# Patient Record
Sex: MALE | Race: WHITE | NOT HISPANIC OR LATINO | URBAN - METROPOLITAN AREA
[De-identification: names, ages, dates, MRNs, and addresses within clinical notes are randomized per-mention and may not be internally consistent; named-entity substitution may affect disease eponyms.]

---

## 2022-07-14 ENCOUNTER — DOCUMENTATION ONLY (OUTPATIENT)
Dept: HEMATOLOGY/ONCOLOGY | Facility: CLINIC | Age: 47
End: 2022-07-14

## 2022-07-14 NOTE — PROGRESS NOTES
CT CAP & CT Neck scheduled 07/21/22 @ [facility] w/ 9:45 arrival   Appt location, date & time confirmed w/ patient wife Janette

## 2022-07-25 PROBLEM — C81.18: Status: ACTIVE | Noted: 2022-07-25

## 2022-07-25 NOTE — PROGRESS NOTES
Subjective:       Patient ID: Med Larsen Jr. is a 47 y.o. male.    Chief Complaint: Results      History of Present Illness  Chief complaint: Hodgkin Lymphoma, at least stage III    Hem Hx:  12/3/19-5/5/20: ABVD x 2, then AVD    HPI: 48 y/o M w/ PMHx of HTN presented with left neck lump. He went to his PCP who referred him to surgery. He had excisional left neck LN biopsy that revealed classical nodular sclerosis Hodgkin lymphoma. Presents to [facility] for follow up.    PET CT 11/2019 consistent with at least stage III disease. Bone marrow biopsy negative 11/2019.    He started ABVD on 12/3/19, completed AVD 5/5/20    Interim PET CT after C3D1 shows complete resolution of adenopathy    Post treatment PET CT showed interval resolution activity related to marrow hyperstimulation and no evidence of disease recurrence.    Today 7/26/22: Patient here today for follow up visit with his wife. He is doing well overall and is without any significant complaints today. He had large lipoma to his right upper back that has been present for several years. He states it occasional aches/burns but it does not bother him too much. No adenopathy. His bowels are moving well and he denies any abdominal pain. Appetite is good and weight is stable. Denies fevers, chills or recent infections/illnesses. He denies SOB, CP, N/V/D/C, melena, hematochezia, testicular mass or swelling. Energy levels are stable. He is smoking again. He still has not done colonoscopy but states he was contacted by office regarding scheduling. He states he will call to schedule colonoscopy. No concerns reported.    PMHx: HTN, Hodgkin lymphoma, DM  PSHx: back surgery x2  Social Hx: current every day smoker 1 ppd, no tobacco, no ETOH  Meds: reviewed  Family Hx: mother: kidney cancer  Allergies: NKDA    Labs:  7/18/22 K 3.7, Cr 0.85, Alb 4.2, TP 7.5, Ca 10.5, , WBC 9.13, Hgb 16.3, , ANC 5.59, ALC 2.58, ESR 1  4/24/22 K 3 Glu 267 Cr 1.08 Alb 3.6 TP 6.9  Ca 10.1  WBC 9.48 Hg 15  ANC 5.75 ESR 4  22 Cr 1.06, Alb 3.7, WBC 7.39, Hgb 15.8, , ANC 4.12, ESR 2  10/24/21 Cr 1.19 Alb 3.9 TP 7.5  WBC 7.6 Hg 15.5  ESR 5  21 Cr 1.06, Alb 3.9, TP 7.2, , WBC 8.06, Hgb 14.8, , ANC 3.29, ESR 0  21 Cr 1.08 Alb 3.9 TP 7.4 [id number] WBC 7.57 Hg 15.6  ANC 3.86 ESR 5  1/15/21 Glu 201, K 4.2, Cr 0.99, Alb 4.1, TP 7.7, LDh 170, WBC 9.67, Hgb 16.6, , ANC 5.34, ESR 0  10/21/20 Glu 148 K 3.8 Cr 0.99 Alb 3.7 TP 7.4 AST 23 ALT 16  ESR 6 WBC 8.15 Hg 15.6  ANC 4.43  7/10/20 K 3.13, , hgb 13.3, , ANC 4.69  20 WBC 16.15 Hg 11.  ANC 12.09  20 K 2.95 Cr 0.96  AST 17 ALT 17 WBC 17.77 Hg 12.9  ANC 13.58  3/9/20 Cr 0.87, Glu 114, K 2.87, AlkPhos 127, , WBC 25.35, Hgb 13.6, ANC 18.43,   2/10/20 Cr 1.15 Glu 169 AlkPhos 131  WBC 21.67 Hg 13.8  ANC 16.41  20 Cr 1.2 AlkPhos 174 AST 28 ALT 36  WBC 24.14 Hg 13.1  ANC 17.39  19 Hgb 11.9, , ANC 8.93, WBC 15.50, ALT 55, AST 44, Alkphos 155  19 HIV neg Alb 2.8 TP 9.1 AlkPhos 195 Hep B S ag neg Hep C Ab neg Hep B C ab neg Hep B S ab neg  b HCG <1.2 WBC 8.51 Hg 12.3  ESR 95 AFP 2    Imagin22 CT soft tissue neck- no evidence of lymphadenopathy in the neck.   22 CT C/A/P: no evidence of lymphoproliferative disease in the thorax, abdomen or pelvis. Small nonobstructive left renal stones. Advanced L5-S1 disc disease.     21 CT N/C/A/P: overall stable appearance to the neck soft tissues, chest, abdomen and pelvis. No evidence of recurrent or progressive disease. Possible wall thickening the proximal transverse colon, correlation with endoscopy if not recently performed. 2 stable incidental pulmonary nodules. No further follow up required according to fleischner criteria.    21 CT N/C/A/P w/ and w/o contrast: no evidence of  lymphoproliferative disease in the neck, thorax, abdomen or pelvis.  7/9/20 PET CT: interval resolution activity related to marrow hyperstimulation. No evidence of disease recurrence.  2/5/20 PET CT: hypermetabolism in marrow likely due to stimulation. Prior hypermetabolic adenopathy has completely resolved  11/18/19 MUGA: LVEF 57%  11/7/19 US scrotum/testicles: no testicular mass. Cysts in b/l epididymal heads  11/7/19 PFTs: FEV1 84%  11/4/19 PET CT: SUV in right axilla 14.7, 3.4x2.1cm, SUV in left axilla 15.2, 4x.18cm, right hilum SUV 8.7 2.9x1.9cm, left obturator SUV 13 1.8cm. SUV in testicles 4.5    Path:  11/6/19 bone marrow biopsy: no evidence of Hodgkin Lymphoma  10/15/19 left neck excisional lymph node biopsy: classic Hodgkin Lymphoma, nodular sclerosis subtype. Fragmented LN with thickened capsule and nodules surrounded by fibrous bands. Nodules contain Tim Tasia cells and lacunar cells. Tim Tasia cells are positive for CD15 and CD30, weak PAX5. No cells positive for ALK1 and pancytokeratin.         Review of Systems  CONSTITUTIONAL: no fevers, no chills, no night sweats, no weight loss, no fatigue, no weakness  HEMATOLOGIC: no abnormal bleeding, no abnormal bruising  ONCOLOGIC: No masses or lumps  HEENT: no vision loss, no tinnitus or hearing loss, no nose bleeding, no dysphagia, no odynophagia  CVS: no chest pain, no palpitations, no dyspnea on exertion  RESP: no shortness of breath, no hemoptysis, no cough  GI: no nausea, no vomiting, no diarrhea, no constipation, no melena, no hematochezia, no hematemesis, no abdominal pain, no increase in abdominal girth  : no dysuria, no hematuria, no hesitancy, no scrotal swelling, no discharge  INTEGUMENT: no rashes, no abnormal bruising, no nail pitting, no hyperpigmentation  NEURO: no falls, no memory loss, no paresthesias or dysesthesias, no urofecal incontinence or retention, no loss of strength on any extremity  MSK: no back pain, no new joint pain,  no joint swelling  PSYCH: no suicidal or homicidal ideation, no depression, no insomnia, no anhedonia  ENDOCRINE: no heat or cold intolerance, no polyuria, no polydipsia         Objective:      Physical Exam  Vitals:    07/26/22 1428   BP: 121/79   Pulse: 83   Resp: 16   Temp: 98.9 °F (37.2 °C)        ECOG PS 0  GA: AAOx3, NAD  HEENT: NCAT, PERRLA, EOMI, fair dentition, no oral ulcers  LYMPH: no cervical, axillary, supraclavicular or inguinal nodes appreciated  CVS: s1s2 RRR, no M/R/G  RESP: CTA b/l, no crackles, mild exp wheez, no rhonchi  ABD: soft, NT, ND, BS+, no hepatosplenomegaly  : no palpable lesions on either testicle  EXT: no deformities, no pedal edema  SKIN: no bruises or purpura, warm and dry, large lipoma right upper back  NEURO: normal mentation, strength 5/5 on all 4 extremities, no sensory deficits     Assessment:       Problem List Items Addressed This Visit        Oncology    Hodgkin's disease, nodular sclerosis, of lymph nodes of multiple sites - Primary            Plan:       1. Hodgkin's disease, nodular sclerosis of lymph nodes of multiple sites C81.18   At least stage III disease, PET CT with findings suggestive of stage IV disease if testicular involvement but US negative, bone marrow biopsy negative, LVEF at 57%, PFTs WNL  Plan: ABVD x2, restaging PET CT, if DS 1-3 then AVD x4, if DS 4 then additional 2 cycles of ABVD, followed by PET CT, if DS 5 then escalated BEACOPP/referral to MD Oakes  Interim PET CT after C3D1 with complete resolution of adenopathy DS 1. Hypermetabolism in marrow was likely due to neulasta and marrow recovery  Bleomycin stopped for following 2 cycles of chemo. AVD completed 5/5/20.  PET CT 7/9/20 showed interval resolution activity related to marrow hyperstimulation. No evidence of disease recurrence.  Port has been removed  Imaging at 6, 12 and 24 months with CT N/C/A/P w/ IV contrast  6 month scans on 1/14/21 and 12 month scans on 7/26/21 without any evidence  of disease, 24 month scans on 7/21/22 without evidence of disease  H&P every 3 months for 2 years, then every 6 months for 3rd year and then annually  He is smoking again and he also has some end expiratory wheezing on exam. We discussed cessation. We again discussed long term toxicity with Bleomycin (lung disease), long term toxicity with adriamycin (heart disease) and general long term possible complications including MDS and AML. He has follow up with pulm next week.  Colonoscopy was due 1/2022, not done, referral sent previously, patient states he will call to schedule  RTC in 6 months with CBC, CMP, LDH, ESR   Call if any questions or concerns      DORA Mata

## 2022-07-26 ENCOUNTER — OFFICE VISIT (OUTPATIENT)
Dept: HEMATOLOGY/ONCOLOGY | Facility: CLINIC | Age: 47
End: 2022-07-26
Payer: MEDICARE

## 2022-07-26 VITALS
TEMPERATURE: 99 F | RESPIRATION RATE: 16 BRPM | OXYGEN SATURATION: 97 % | BODY MASS INDEX: 30.64 KG/M2 | SYSTOLIC BLOOD PRESSURE: 121 MMHG | WEIGHT: 201.5 LBS | DIASTOLIC BLOOD PRESSURE: 79 MMHG | HEART RATE: 83 BPM

## 2022-07-26 DIAGNOSIS — C81.18 HODGKIN'S DISEASE, NODULAR SCLEROSIS, OF LYMPH NODES OF MULTIPLE SITES: Primary | ICD-10-CM

## 2022-07-26 PROCEDURE — 99213 OFFICE O/P EST LOW 20 MIN: CPT | Mod: ,,, | Performed by: NURSE PRACTITIONER

## 2022-07-26 PROCEDURE — 99213 PR OFFICE/OUTPT VISIT, EST, LEVL III, 20-29 MIN: ICD-10-PCS | Mod: ,,, | Performed by: NURSE PRACTITIONER

## 2023-01-25 ENCOUNTER — TELEPHONE (OUTPATIENT)
Dept: HEMATOLOGY/ONCOLOGY | Facility: CLINIC | Age: 48
End: 2023-01-25
Payer: MEDICARE

## 2023-01-26 DIAGNOSIS — E87.6 HYPOKALEMIA: Primary | ICD-10-CM

## 2023-01-26 RX ORDER — POTASSIUM CHLORIDE 20 MEQ/1
40 TABLET, EXTENDED RELEASE ORAL 2 TIMES DAILY
Qty: 8 TABLET | Refills: 0 | Status: SHIPPED | OUTPATIENT
Start: 2023-01-26 | End: 2023-01-28

## 2023-01-27 ENCOUNTER — OFFICE VISIT (OUTPATIENT)
Dept: HEMATOLOGY/ONCOLOGY | Facility: CLINIC | Age: 48
End: 2023-01-27
Payer: MEDICARE

## 2023-01-27 VITALS
HEIGHT: 68 IN | WEIGHT: 188 LBS | DIASTOLIC BLOOD PRESSURE: 69 MMHG | TEMPERATURE: 99 F | OXYGEN SATURATION: 97 % | BODY MASS INDEX: 28.49 KG/M2 | SYSTOLIC BLOOD PRESSURE: 112 MMHG | HEART RATE: 77 BPM | RESPIRATION RATE: 18 BRPM

## 2023-01-27 DIAGNOSIS — C81.18 HODGKIN'S DISEASE, NODULAR SCLEROSIS, OF LYMPH NODES OF MULTIPLE SITES: Primary | ICD-10-CM

## 2023-01-27 DIAGNOSIS — E87.6 HYPOKALEMIA: ICD-10-CM

## 2023-01-27 DIAGNOSIS — R74.01 TRANSAMINITIS: ICD-10-CM

## 2023-01-27 DIAGNOSIS — N17.9 AKI (ACUTE KIDNEY INJURY): ICD-10-CM

## 2023-01-27 PROCEDURE — 99215 OFFICE O/P EST HI 40 MIN: CPT | Mod: ,,, | Performed by: STUDENT IN AN ORGANIZED HEALTH CARE EDUCATION/TRAINING PROGRAM

## 2023-01-27 PROCEDURE — 99215 PR OFFICE/OUTPT VISIT, EST, LEVL V, 40-54 MIN: ICD-10-PCS | Mod: ,,, | Performed by: STUDENT IN AN ORGANIZED HEALTH CARE EDUCATION/TRAINING PROGRAM

## 2023-01-27 NOTE — PROGRESS NOTES
Subjective:       Patient ID: Med Larsen Jr. is a 47 y.o. male.    Chief Complaint: NO COMPLAINTS      History of Present Illness  Chief complaint: Hodgkin Lymphoma, at least stage III    Hem Hx:  12/3/19-5/5/20: ABVD x 2, then AVD    HPI: 46 y/o M w/ PMHx of HTN presented with left neck lump. He went to his PCP who referred him to surgery. He had excisional left neck LN biopsy that revealed classical nodular sclerosis Hodgkin lymphoma. Presents to [facility] for follow up.    PET CT 11/2019 consistent with at least stage III disease. Bone marrow biopsy negative 11/2019.    He started ABVD on 12/3/19, completed AVD 5/5/20    Interim PET CT after C3D1 shows complete resolution of adenopathy    Post treatment PET CT showed interval resolution activity related to marrow hyperstimulation and no evidence of disease recurrence.    Today , 01/27/2023, patient denies any acute concerns today.  He denies any fevers, chills, drenching night sweats, decreased appetite, weight loss.  He denies any alcohol use, over-the-counter herbs, supplements use.  He does report using 2 tablets of Tylenol every night for knee pain.    PMHx: HTN, Hodgkin lymphoma, DM  PSHx: back surgery x2  Social Hx: current every day smoker 1 ppd, no tobacco, no ETOH  Meds: reviewed  Family Hx: mother: kidney cancer  Allergies: NKDA    Labs:  01/25/2023:  Creatinine 1.51, albumin 3.6, calcium 9.6, alkaline phosphatase 96, total bili 0.9, AST 43, ALT 57, , WBC count 8.9, hemoglobin 14.2, platelet count 193, ANC 5.36, sedimentation rate 10.  7/18/22 K 3.7, Cr 0.85, Alb 4.2, TP 7.5, Ca 10.5, , WBC 9.13, Hgb 16.3, , ANC 5.59, ALC 2.58, ESR 1  4/24/22 K 3 Glu 267 Cr 1.08 Alb 3.6 TP 6.9 Ca 10.1  WBC 9.48 Hg 15  ANC 5.75 ESR 4  1/17/22 Cr 1.06, Alb 3.7, WBC 7.39, Hgb 15.8, , ANC 4.12, ESR 2  10/24/21 Cr 1.19 Alb 3.9 TP 7.5  WBC 7.6 Hg 15.5  ESR 5  7/23/21 Cr 1.06, Alb 3.9, TP 7.2, , WBC 8.06, Hgb  14.8, , ANC 3.29, ESR 0  21 Cr 1.08 Alb 3.9 TP 7.4 [id number] WBC 7.57 Hg 15.6  ANC 3.86 ESR 5  1/15/21 Glu 201, K 4.2, Cr 0.99, Alb 4.1, TP 7.7, LDh 170, WBC 9.67, Hgb 16.6, , ANC 5.34, ESR 0  10/21/20 Glu 148 K 3.8 Cr 0.99 Alb 3.7 TP 7.4 AST 23 ALT 16  ESR 6 WBC 8.15 Hg 15.6  ANC 4.43  7/10/20 K 3.13, , hgb 13.3, , ANC 4.69  20 WBC 16.15 Hg 11.  ANC 12.09  20 K 2.95 Cr 0.96  AST 17 ALT 17 WBC 17.77 Hg 12.9  ANC 13.58  3/9/20 Cr 0.87, Glu 114, K 2.87, AlkPhos 127, , WBC 25.35, Hgb 13.6, ANC 18.43,   2/10/20 Cr 1.15 Glu 169 AlkPhos 131  WBC 21.67 Hg 13.8  ANC 16.41  20 Cr 1.2 AlkPhos 174 AST 28 ALT 36  WBC 24.14 Hg 13.1  ANC 17.39  19 Hgb 11.9, , ANC 8.93, WBC 15.50, ALT 55, AST 44, Alkphos 155  19 HIV neg Alb 2.8 TP 9.1 AlkPhos 195 Hep B S ag neg Hep C Ab neg Hep B C ab neg Hep B S ab neg  b HCG <1.2 WBC 8.51 Hg 12.3  ESR 95 AFP 2    Imagin22 CT soft tissue neck- no evidence of lymphadenopathy in the neck.   22 CT C/A/P: no evidence of lymphoproliferative disease in the thorax, abdomen or pelvis. Small nonobstructive left renal stones. Advanced L5-S1 disc disease.     21 CT N/C/A/P: overall stable appearance to the neck soft tissues, chest, abdomen and pelvis. No evidence of recurrent or progressive disease. Possible wall thickening the proximal transverse colon, correlation with endoscopy if not recently performed. 2 stable incidental pulmonary nodules. No further follow up required according to fleischner criteria.    21 CT N/C/A/P w/ and w/o contrast: no evidence of lymphoproliferative disease in the neck, thorax, abdomen or pelvis.  20 PET CT: interval resolution activity related to marrow hyperstimulation. No evidence of disease recurrence.  20 PET CT: hypermetabolism in marrow likely due to stimulation. Prior  hypermetabolic adenopathy has completely resolved  11/18/19 MUGA: LVEF 57%  11/7/19 US scrotum/testicles: no testicular mass. Cysts in b/l epididymal heads  11/7/19 PFTs: FEV1 84%  11/4/19 PET CT: SUV in right axilla 14.7, 3.4x2.1cm, SUV in left axilla 15.2, 4x.18cm, right hilum SUV 8.7 2.9x1.9cm, left obturator SUV 13 1.8cm. SUV in testicles 4.5    Path:  11/6/19 bone marrow biopsy: no evidence of Hodgkin Lymphoma  10/15/19 left neck excisional lymph node biopsy: classic Hodgkin Lymphoma, nodular sclerosis subtype. Fragmented LN with thickened capsule and nodules surrounded by fibrous bands. Nodules contain Tim Tasia cells and lacunar cells. Tim Tasia cells are positive for CD15 and CD30, weak PAX5. No cells positive for ALK1 and pancytokeratin.         Review of Systems  CONSTITUTIONAL: no fevers, no chills, no night sweats, no weight loss, no fatigue, no weakness  HEMATOLOGIC: no abnormal bleeding, no abnormal bruising  ONCOLOGIC: No masses or lumps  HEENT: no vision loss, no tinnitus or hearing loss, no nose bleeding, no dysphagia, no odynophagia  CVS: no chest pain, no palpitations, no dyspnea on exertion  RESP: no shortness of breath, no hemoptysis, no cough  GI: no nausea, no vomiting, no diarrhea, no constipation, no melena, no hematochezia, no hematemesis, no abdominal pain, no increase in abdominal girth  : no dysuria, no hematuria, no hesitancy, no scrotal swelling, no discharge  INTEGUMENT: no rashes, no abnormal bruising, no nail pitting, no hyperpigmentation  NEURO: no falls, no memory loss, no paresthesias or dysesthesias, no urofecal incontinence or retention, no loss of strength on any extremity  MSK: no back pain, no new joint pain, no joint swelling  PSYCH: no suicidal or homicidal ideation, no depression, no insomnia, no anhedonia  ENDOCRINE: no heat or cold intolerance, no polyuria, no polydipsia         Objective:      Physical Exam  Vitals:    01/27/23 1037   BP: 112/69   Pulse: 77    Resp: 18   Temp: 98.5 °F (36.9 °C)        ECOG PS 0  GA: AAOx3, NAD  HEENT: NCAT, PERRLA, EOMI, fair dentition, no oral ulcers  LYMPH: no cervical, axillary, supraclavicular or inguinal nodes appreciated  CVS: s1s2 RRR, no M/R/G  RESP: CTA b/l, no crackles, mild exp wheez, no rhonchi  ABD: soft, NT, ND, BS+, no hepatosplenomegaly  : no palpable lesions on either testicle  EXT: no deformities, no pedal edema  SKIN: no bruises or purpura, warm and dry, large lipoma right upper back  NEURO: normal mentation, strength 5/5 on all 4 extremities, no sensory deficits     Assessment:       Problem List Items Addressed This Visit          Renal/    Hypokalemia    STANLEY (acute kidney injury)       Oncology    Hodgkin's disease, nodular sclerosis, of lymph nodes of multiple sites - Primary       GI    Transaminitis           Plan:       1. Hodgkin's disease, nodular sclerosis of lymph nodes of multiple sites C81.18   At least stage III disease, PET CT with findings suggestive of stage IV disease if testicular involvement but US negative, bone marrow biopsy negative, LVEF at 57%, PFTs WNL  Plan: ABVD x2, restaging PET CT, if DS 1-3 then AVD x4, if DS 4 then additional 2 cycles of ABVD, followed by PET CT, if DS 5 then escalated BEACOPP/referral to MD [facility]akes  Interim PET CT after C3D1 with complete resolution of adenopathy DS 1. Hypermetabolism in marrow was likely due to neulasta and marrow recovery  Bleomycin stopped for following 2 cycles of chemo. AVD completed 5/5/20.  PET CT 7/9/20 showed interval resolution activity related to marrow hyperstimulation. No evidence of disease recurrence.  Port has been removed  Imaging at 6, 12 and 24 months with CT N/C/A/P w/ IV contrast  6 month scans on 1/14/21 and 12 month scans on 7/26/21 without any evidence of disease, 24 month scans on 7/21/22 without evidence of disease  H&P every 3 months for 2 years, then every 6 months for 3rd year and then annually  Previously discussed  long term toxicity with Bleomycin (lung disease), long term toxicity with adriamycin (heart disease) and general long term possible complications including MDS and AML.   Colonoscopy was due 1/2022, not done, referral sent previously, patient states he will call to schedule      Plan   Potassium replacement sent to pharmacy yesterday.  Patient has been taking it since yesterday night.    Will repeat patient's CMP today to evaluate for STANLEY, and transaminitis as well as hypokalemia   If patient's transaminitis has worsened, will obtain an ultrasound abdomen  Patient was asked to call our office on 31st January 2023 if he has not heard from us regarding above workup.  Plan to follow-up in 6 months with repeat labs if above workup is within normal limits.      Portions of the record may have been created with voice recognition software. Occasional wrong-word or sound-a-like substitutions may have occurred due to the inherent limitations of voice recognition software. Read the chart carefully and recognize, using context, where substitutions have occurred.        Portions of the record may have been created with voice recognition software. Occasional wrong-word or sound-a-like substitutions may have occurred due to the inherent limitations of voice recognition software. Read the chart carefully and recognize, using context, where substitutions have occurred.

## 2023-01-31 ENCOUNTER — TELEPHONE (OUTPATIENT)
Dept: HEMATOLOGY/ONCOLOGY | Facility: CLINIC | Age: 48
End: 2023-01-31
Payer: MEDICARE

## 2023-01-31 DIAGNOSIS — C81.90 HODGKIN LYMPHOMA, UNSPECIFIED HODGKIN LYMPHOMA TYPE, UNSPECIFIED BODY REGION: Primary | ICD-10-CM

## 2023-01-31 DIAGNOSIS — R74.01 TRANSAMINITIS: ICD-10-CM

## 2023-01-31 NOTE — TELEPHONE ENCOUNTER
Spoke with Jamila, gave her Med's results per Dr. Snowden and him wanting to yulisa an U/S of the abd. She expressed her understanding and will await the call for the U/S.

## 2023-05-01 PROBLEM — N17.9 AKI (ACUTE KIDNEY INJURY): Status: RESOLVED | Noted: 2023-01-27 | Resolved: 2023-05-01

## 2023-07-27 ENCOUNTER — OFFICE VISIT (OUTPATIENT)
Dept: HEMATOLOGY/ONCOLOGY | Facility: CLINIC | Age: 48
End: 2023-07-27
Payer: MEDICARE

## 2023-07-27 VITALS
HEART RATE: 82 BPM | WEIGHT: 184.13 LBS | RESPIRATION RATE: 20 BRPM | HEIGHT: 68 IN | TEMPERATURE: 98 F | SYSTOLIC BLOOD PRESSURE: 107 MMHG | BODY MASS INDEX: 27.91 KG/M2 | OXYGEN SATURATION: 99 % | DIASTOLIC BLOOD PRESSURE: 71 MMHG

## 2023-07-27 DIAGNOSIS — R74.01 TRANSAMINITIS: ICD-10-CM

## 2023-07-27 DIAGNOSIS — C81.90 HODGKIN LYMPHOMA, UNSPECIFIED HODGKIN LYMPHOMA TYPE, UNSPECIFIED BODY REGION: Primary | ICD-10-CM

## 2023-07-27 PROCEDURE — 99215 OFFICE O/P EST HI 40 MIN: CPT | Mod: ,,,

## 2023-07-27 PROCEDURE — 99215 PR OFFICE/OUTPT VISIT, EST, LEVL V, 40-54 MIN: ICD-10-PCS | Mod: ,,,

## 2023-07-27 RX ORDER — POTASSIUM CHLORIDE 20 MEQ/1
20 TABLET, EXTENDED RELEASE ORAL
COMMUNITY
Start: 2023-07-17

## 2023-07-27 RX ORDER — COLCHICINE 0.6 MG/1
TABLET ORAL
COMMUNITY
Start: 2023-07-26

## 2023-07-27 NOTE — PROGRESS NOTES
Subjective:       Patient ID: Med Larsen Jr. is a 48 y.o. male.    Chief Complaint: Results      History of Present Illness  Chief complaint: Hodgkin Lymphoma, at least stage III    Hem Hx:  12/3/19-5/5/20: ABVD x 2, then AVD    HPI: 48 y/o M w/ PMHx of HTN presented with left neck lump. He went to his PCP who referred him to surgery. He had excisional left neck LN biopsy that revealed classical nodular sclerosis Hodgkin lymphoma. Presents to [facility] for follow up.    PET CT 11/2019 consistent with at least stage III disease. Bone marrow biopsy negative 11/2019.    He started ABVD on 12/3/19, completed AVD 5/5/20    Interim PET CT after C3D1 shows complete resolution of adenopathy    Post treatment PET CT showed interval resolution activity related to marrow hyperstimulation and no evidence of disease recurrence.    Today ,07/27/2023, patient denies any acute concerns today.  He denies any fevers, chills, drenching night sweats, decreased appetite, weight loss.  He denies any alcohol use, over-the-counter herbs, supplements use.  He has stopped taking acetaminophen for pain. His PCP is managing his hypokalemia, new potassium supplements prescribed yesterday. He has not had a follow-up with GI as recommended for colonoscopy.    PMHx: HTN, Hodgkin lymphoma, DM  PSHx: back surgery x2  Social Hx: current every day smoker 1 ppd, no tobacco, no ETOH  Meds: reviewed  Family Hx: mother: kidney cancer  Allergies: NKDA    Labs:  07/26/23: cr 0.99, alb 3.7, ca 9.5, alkphos 93, , AST 92, , wbc 8.76, hgb 12.9, plt 193, ANC 5.09  01/25/2023:  Creatinine 1.51, albumin 3.6, calcium 9.6, alkaline phosphatase 96, total bili 0.9, AST 43, ALT 57, , WBC count 8.9, hemoglobin 14.2, platelet count 193, ANC 5.36, sedimentation rate 10.  7/18/22 K 3.7, Cr 0.85, Alb 4.2, TP 7.5, Ca 10.5, , WBC 9.13, Hgb 16.3, , ANC 5.59, ALC 2.58, ESR 1  4/24/22 K 3 Glu 267 Cr 1.08 Alb 3.6 TP 6.9 Ca 10.1  WBC  9.48 Hg 15  ANC 5.75 ESR 4  22 Cr 1.06, Alb 3.7, WBC 7.39, Hgb 15.8, , ANC 4.12, ESR 2  10/24/21 Cr 1.19 Alb 3.9 TP 7.5  WBC 7.6 Hg 15.5  ESR 5  21 Cr 1.06, Alb 3.9, TP 7.2, , WBC 8.06, Hgb 14.8, , ANC 3.29, ESR 0  21 Cr 1.08 Alb 3.9 TP 7.4 [id number] WBC 7.57 Hg 15.6  ANC 3.86 ESR 5  1/15/21 Glu 201, K 4.2, Cr 0.99, Alb 4.1, TP 7.7, LDh 170, WBC 9.67, Hgb 16.6, , ANC 5.34, ESR 0  10/21/20 Glu 148 K 3.8 Cr 0.99 Alb 3.7 TP 7.4 AST 23 ALT 16  ESR 6 WBC 8.15 Hg 15.6  ANC 4.43  7/10/20 K 3.13, , hgb 13.3, , ANC 4.69  20 WBC 16.15 Hg 11.  ANC 12.09  20 K 2.95 Cr 0.96  AST 17 ALT 17 WBC 17.77 Hg 12.9  ANC 13.58  3/9/20 Cr 0.87, Glu 114, K 2.87, AlkPhos 127, , WBC 25.35, Hgb 13.6, ANC 18.43,   2/10/20 Cr 1.15 Glu 169 AlkPhos 131  WBC 21.67 Hg 13.8  ANC 16.41  20 Cr 1.2 AlkPhos 174 AST 28 ALT 36  WBC 24.14 Hg 13.1  ANC 17.39  19 Hgb 11.9, , ANC 8.93, WBC 15.50, ALT 55, AST 44, Alkphos 155  19 HIV neg Alb 2.8 TP 9.1 AlkPhos 195 Hep B S ag neg Hep C Ab neg Hep B C ab neg Hep B S ab neg  b HCG <1.2 WBC 8.51 Hg 12.3  ESR 95 AFP 2    Imagin2023 Abdominal US: Nonspecific gallbladder distention with no associated findings. No appreciable gallstone. Small hepatic cysts and renal cysts and probable small obstructing intrarenal calculi. No other significant findings within the abdomen. The pancreas is obscured.   22 CT soft tissue neck- no evidence of lymphadenopathy in the neck.   22 CT C/A/P: no evidence of lymphoproliferative disease in the thorax, abdomen or pelvis. Small nonobstructive left renal stones. Advanced L5-S1 disc disease.     21 CT N/C/A/P: overall stable appearance to the neck soft tissues, chest, abdomen and pelvis. No evidence of recurrent or progressive disease. Possible wall thickening the  proximal transverse colon, correlation with endoscopy if not recently performed. 2 stable incidental pulmonary nodules. No further follow up required according to fleischner criteria.    1/14/21 CT N/C/A/P w/ and w/o contrast: no evidence of lymphoproliferative disease in the neck, thorax, abdomen or pelvis.  7/9/20 PET CT: interval resolution activity related to marrow hyperstimulation. No evidence of disease recurrence.  2/5/20 PET CT: hypermetabolism in marrow likely due to stimulation. Prior hypermetabolic adenopathy has completely resolved  11/18/19 MUGA: LVEF 57%  11/7/19 US scrotum/testicles: no testicular mass. Cysts in b/l epididymal heads  11/7/19 PFTs: FEV1 84%  11/4/19 PET CT: SUV in right axilla 14.7, 3.4x2.1cm, SUV in left axilla 15.2, 4x.18cm, right hilum SUV 8.7 2.9x1.9cm, left obturator SUV 13 1.8cm. SUV in testicles 4.5    Path:  11/6/19 bone marrow biopsy: no evidence of Hodgkin Lymphoma  10/15/19 left neck excisional lymph node biopsy: classic Hodgkin Lymphoma, nodular sclerosis subtype. Fragmented LN with thickened capsule and nodules surrounded by fibrous bands. Nodules contain Tim Tasia cells and lacunar cells. Tim Tasia cells are positive for CD15 and CD30, weak PAX5. No cells positive for ALK1 and pancytokeratin.         Review of Systems  CONSTITUTIONAL: no fevers, no chills, no night sweats, no weight loss, no fatigue, no weakness  HEMATOLOGIC: no abnormal bleeding, no abnormal bruising  ONCOLOGIC: No masses or lumps  HEENT: no vision loss, no tinnitus or hearing loss, no nose bleeding, no dysphagia, no odynophagia  CVS: no chest pain, no palpitations, no dyspnea on exertion  RESP: no shortness of breath, no hemoptysis, no cough  GI: no nausea, no vomiting, no diarrhea, no constipation, no melena, no hematochezia, no hematemesis, no abdominal pain, no increase in abdominal girth  : no dysuria, no hematuria, no hesitancy, no scrotal swelling, no discharge  INTEGUMENT: no rashes,  no abnormal bruising, no nail pitting, no hyperpigmentation  NEURO: no falls, no memory loss, no paresthesias or dysesthesias, no urofecal incontinence or retention, no loss of strength on any extremity  MSK: no back pain, no new joint pain, no joint swelling  PSYCH: no suicidal or homicidal ideation, no depression, no insomnia, no anhedonia  ENDOCRINE: no heat or cold intolerance, no polyuria, no polydipsia         Objective:      Physical Exam  Vitals:    07/27/23 1112   BP: 107/71   Pulse: 82   Resp: 20   Temp: 97.5 °F (36.4 °C)        ECOG PS 0  GA: AAOx3, NAD  HEENT: NCAT, PERRLA, EOMI, fair dentition, no oral ulcers  LYMPH: no cervical, axillary, supraclavicular or inguinal nodes appreciated  CVS: s1s2 RRR, no M/R/G  RESP: CTA b/l, no crackles, mild exp wheez, no rhonchi  ABD: soft, NT, ND, BS+, no hepatosplenomegaly  : no palpable lesions on either testicle  EXT: no deformities, no pedal edema  SKIN: no bruises or purpura, warm and dry, large lipoma right upper back  NEURO: normal mentation, strength 5/5 on all 4 extremities, no sensory deficits     Assessment:       Problem List Items Addressed This Visit    None  Visit Diagnoses       Hodgkin lymphoma, unspecified Hodgkin lymphoma type, unspecified body region    -  Primary                Plan:       1. Hodgkin's disease, nodular sclerosis of lymph nodes of multiple sites C81.18   At least stage III disease, PET CT with findings suggestive of stage IV disease if testicular involvement but US negative, bone marrow biopsy negative, LVEF at 57%, PFTs WNL  Plan: ABVD x2, restaging PET CT, if DS 1-3 then AVD x4, if DS 4 then additional 2 cycles of ABVD, followed by PET CT, if DS 5 then escalated BEACOPP/referral to MD Oakes  Interim PET CT after C3D1 with complete resolution of adenopathy DS 1. Hypermetabolism in marrow was likely due to neulasta and marrow recovery  Bleomycin stopped for following 2 cycles of chemo. AVD completed 5/5/20.  PET CT 7/9/20  showed interval resolution activity related to marrow hyperstimulation. No evidence of disease recurrence.  Port has been removed  Imaging at 6, 12 and 24 months with CT N/C/A/P w/ IV contrast  6 month scans on 1/14/21 and 12 month scans on 7/26/21 without any evidence of disease, 24 month scans on 7/21/22 without evidence of disease  H&P every 3 months for 2 years, then every 6 months for 3rd year and then annually  Previously discussed long term toxicity with Bleomycin (lung disease), long term toxicity with adriamycin (heart disease) and general long term possible complications including MDS and AML.   Colonoscopy was due 1/2022, not done, referral sent previously, patient states he will call to schedule      Plan   Hypokalemia managed by PCP w/ supplementation. Will send copy of today's labs.  Worsening transaminitis, refer to GI and ordered CT of abdomen/pelvis  Telephone visit 6 weeks to discuss, if normal results will resume lymphoma surveillance with annual labs and clinic f/u.

## 2023-09-08 ENCOUNTER — OFFICE VISIT (OUTPATIENT)
Dept: HEMATOLOGY/ONCOLOGY | Facility: CLINIC | Age: 48
End: 2023-09-08
Payer: MEDICARE

## 2023-09-08 VITALS — BODY MASS INDEX: 27.89 KG/M2 | HEIGHT: 68 IN | WEIGHT: 184 LBS

## 2023-09-08 DIAGNOSIS — E87.6 HYPOKALEMIA: ICD-10-CM

## 2023-09-08 DIAGNOSIS — C81.90 HODGKIN LYMPHOMA, UNSPECIFIED HODGKIN LYMPHOMA TYPE, UNSPECIFIED BODY REGION: Primary | ICD-10-CM

## 2023-09-08 PROCEDURE — 99442 PR PHYSICIAN TELEPHONE EVALUATION 11-20 MIN: CPT | Mod: 95,,,

## 2023-09-08 PROCEDURE — 99442 PR PHYSICIAN TELEPHONE EVALUATION 11-20 MIN: ICD-10-PCS | Mod: 95,,,

## 2023-09-08 RX ORDER — POTASSIUM CHLORIDE 20 MEQ/1
20 TABLET, EXTENDED RELEASE ORAL 2 TIMES DAILY
Qty: 60 TABLET | Refills: 0 | Status: SHIPPED | OUTPATIENT
Start: 2023-09-08

## 2023-09-08 NOTE — PROGRESS NOTES
Established Patient - Audio Only Telehealth Visit     The patient location is: Home  The chief complaint leading to consultation is: Hodgkin lymphoma  Visit type: Virtual visit with audio only (telephone)  Total time spent with patient: 13 minutes       The reason for the audio only service rather than synchronous audio and video virtual visit was related to technical difficulties or patient preference/necessity.     Each patient to whom I provide medical services by telemedicine is:  (1) informed of the relationship between the physician and patient and the respective role of any other health care provider with respect to management of the patient; and (2) notified that they may decline to receive medical services by telemedicine and may withdraw from such care at any time. Patient verbally consented to receive this service via voice-only telephone call.       Subjective:       Patient ID: Med Larsen Jr. is a 48 y.o. male.    Chief Complaint: Results      History of Present Illness  Chief complaint: Hodgkin Lymphoma, at least stage III    Hem Hx:  12/3/19-5/5/20: ABVD x 2, then AVD    HPI: 48 y/o M w/ PMHx of HTN presented with left neck lump. He went to his PCP who referred him to surgery. He had excisional left neck LN biopsy that revealed classical nodular sclerosis Hodgkin lymphoma. Presents to [facility] for follow up.    PET CT 11/2019 consistent with at least stage III disease. Bone marrow biopsy negative 11/2019.    He started ABVD on 12/3/19, completed AVD 5/5/20    Interim PET CT after C3D1 shows complete resolution of adenopathy    Post treatment PET CT showed interval resolution activity related to marrow hyperstimulation and no evidence of disease recurrence.    Today ,09/08/2023, patient denies any acute concerns today.  He denies any fevers, chills, drenching night sweats, decreased appetite, weight loss.  He denies any alcohol use, over-the-counter herbs, supplements use.   His PCP is managing his  hypokalemia. He had a follow-up with GI a few days ago and is scheduled for a colonoscopy end of September 2023. Recent labs are stable transaminitis resolved.     PMHx: HTN, Hodgkin lymphoma, DM  PSHx: back surgery x2  Social Hx: current every day smoker 1 ppd, no tobacco, no ETOH  Meds: reviewed  Family Hx: mother: kidney cancer  Allergies: NKDA    Labs:  09/06/23: cr 1.26, alb 3.7, ca 9.9, K+ 2.6, AST 30, ALT 40, wbc 9.94, hgb 13.4, plt 208  07/26/23: cr 0.99, alb 3.7, ca 9.5, alkphos 93, , AST 92, , wbc 8.76, hgb 12.9, plt 193, ANC 5.09  01/25/2023:  Creatinine 1.51, albumin 3.6, calcium 9.6, K+ 2.2, alkaline phosphatase 96, total bili 0.9, AST 43, ALT 57, , WBC count 8.9, hemoglobin 14.2, platelet count 193, ANC 5.36, sedimentation rate 10.  7/18/22 K 3.7, Cr 0.85, Alb 4.2, TP 7.5, Ca 10.5, , WBC 9.13, Hgb 16.3, , ANC 5.59, ALC 2.58, ESR 1  4/24/22 K 3 Glu 267 Cr 1.08 Alb 3.6 TP 6.9 Ca 10.1  WBC 9.48 Hg 15  ANC 5.75 ESR 4  1/17/22 Cr 1.06, Alb 3.7, WBC 7.39, Hgb 15.8, , ANC 4.12, ESR 2  10/24/21 Cr 1.19 Alb 3.9 TP 7.5  WBC 7.6 Hg 15.5  ESR 5  7/23/21 Cr 1.06, Alb 3.9, TP 7.2, , WBC 8.06, Hgb 14.8, , ANC 3.29, ESR 0  4/13/21 Cr 1.08 Alb 3.9 TP 7.4 [id number] WBC 7.57 Hg 15.6  ANC 3.86 ESR 5  1/15/21 Glu 201, K 4.2, Cr 0.99, Alb 4.1, TP 7.7, LDh 170, WBC 9.67, Hgb 16.6, , ANC 5.34, ESR 0  10/21/20 Glu 148 K 3.8 Cr 0.99 Alb 3.7 TP 7.4 AST 23 ALT 16  ESR 6 WBC 8.15 Hg 15.6  ANC 4.43  7/10/20 K 3.13, , hgb 13.3, , ANC 4.69  5/5/20 WBC 16.15 Hg 11.  ANC 12.09  4/6/20 K 2.95 Cr 0.96  AST 17 ALT 17 WBC 17.77 Hg 12.9  ANC 13.58  3/9/20 Cr 0.87, Glu 114, K 2.87, AlkPhos 127, , WBC 25.35, Hgb 13.6, ANC 18.43,   2/10/20 Cr 1.15 Glu 169 AlkPhos 131  WBC 21.67 Hg 13.8  ANC 16.41  1/13/20 Cr 1.2 AlkPhos 174 AST 28 ALT 36  WBC 24.14 Hg 13.1  ANC  17.39  19 Hgb 11.9, , ANC 8.93, WBC 15.50, ALT 55, AST 44, Alkphos 155  19 HIV neg Alb 2.8 TP 9.1 AlkPhos 195 Hep B S ag neg Hep C Ab neg Hep B C ab neg Hep B S ab neg  b HCG <1.2 WBC 8.51 Hg 12.3  ESR 95 AFP 2    Imagin2023 CT abd/pelvis w/ contrast: No evidence of any adenopathy. Few small subcentimeter low-density areas within the liver difficult to characterize due to their small size, most likely represent small hepatic cyst.  2023 Abdominal US: Nonspecific gallbladder distention with no associated findings. No appreciable gallstone. Small hepatic cysts and renal cysts and probable small obstructing intrarenal calculi. No other significant findings within the abdomen. The pancreas is obscured.   22 CT soft tissue neck- no evidence of lymphadenopathy in the neck.   22 CT C/A/P: no evidence of lymphoproliferative disease in the thorax, abdomen or pelvis. Small nonobstructive left renal stones. Advanced L5-S1 disc disease.     21 CT N/C/A/P: overall stable appearance to the neck soft tissues, chest, abdomen and pelvis. No evidence of recurrent or progressive disease. Possible wall thickening the proximal transverse colon, correlation with endoscopy if not recently performed. 2 stable incidental pulmonary nodules. No further follow up required according to fleischner criteria.    21 CT N/C/A/P w/ and w/o contrast: no evidence of lymphoproliferative disease in the neck, thorax, abdomen or pelvis.  20 PET CT: interval resolution activity related to marrow hyperstimulation. No evidence of disease recurrence.  20 PET CT: hypermetabolism in marrow likely due to stimulation. Prior hypermetabolic adenopathy has completely resolved  19 MUGA: LVEF 57%  19 US scrotum/testicles: no testicular mass. Cysts in b/l epididymal heads  19 PFTs: FEV1 84%  19 PET CT: SUV in right axilla 14.7, 3.4x2.1cm, SUV in left axilla 15.2, 4x.18cm,  right hilum SUV 8.7 2.9x1.9cm, left obturator SUV 13 1.8cm. SUV in testicles 4.5    Path:  11/6/19 bone marrow biopsy: no evidence of Hodgkin Lymphoma  10/15/19 left neck excisional lymph node biopsy: classic Hodgkin Lymphoma, nodular sclerosis subtype. Fragmented LN with thickened capsule and nodules surrounded by fibrous bands. Nodules contain Tim Tasia cells and lacunar cells. Tim Tasia cells are positive for CD15 and CD30, weak PAX5. No cells positive for ALK1 and pancytokeratin.         Review of Systems  CONSTITUTIONAL: no fevers, no chills, no night sweats, no weight loss, no fatigue, no weakness  HEMATOLOGIC: no abnormal bleeding, no abnormal bruising  ONCOLOGIC: No masses or lumps  HEENT: no vision loss, no tinnitus or hearing loss, no nose bleeding, no dysphagia, no odynophagia  CVS: no chest pain, no palpitations, no dyspnea on exertion  RESP: no shortness of breath, no hemoptysis, no cough  GI: no nausea, no vomiting, no diarrhea, no constipation, no melena, no hematochezia, no hematemesis, no abdominal pain, no increase in abdominal girth  : no dysuria, no hematuria, no hesitancy, no scrotal swelling, no discharge  INTEGUMENT: no rashes, no abnormal bruising, no nail pitting, no hyperpigmentation  NEURO: no falls, no memory loss, no paresthesias or dysesthesias, no urofecal incontinence or retention, no loss of strength on any extremity  MSK: no back pain, no new joint pain, no joint swelling  PSYCH: no suicidal or homicidal ideation, no depression, no insomnia, no anhedonia  ENDOCRINE: no heat or cold intolerance, no polyuria, no polydipsia         Objective:      Physical Exam  There were no vitals filed for this visit.       ECOG PS 0  GA: AAOx3, NAD  HEENT: NCAT, PERRLA, EOMI, fair dentition, no oral ulcers  LYMPH: no cervical, axillary, supraclavicular or inguinal nodes appreciated  CVS: s1s2 RRR, no M/R/G  RESP: CTA b/l, no crackles, mild exp wheez, no rhonchi  ABD: soft, NT, ND, BS+,  no hepatosplenomegaly  : no palpable lesions on either testicle  EXT: no deformities, no pedal edema  SKIN: no bruises or purpura, warm and dry, large lipoma right upper back  NEURO: normal mentation, strength 5/5 on all 4 extremities, no sensory deficits     Assessment:       Problem List Items Addressed This Visit    None            Plan:       1. Hodgkin's disease, nodular sclerosis of lymph nodes of multiple sites C81.18   At least stage III disease, PET CT with findings suggestive of stage IV disease if testicular involvement but US negative, bone marrow biopsy negative, LVEF at 57%, PFTs WNL  Plan: ABVD x2, restaging PET CT, if DS 1-3 then AVD x4, if DS 4 then additional 2 cycles of ABVD, followed by PET CT, if DS 5 then escalated BEACOPP/referral to MD Oakes  Interim PET CT after C3D1 with complete resolution of adenopathy DS 1. Hypermetabolism in marrow was likely due to neulasta and marrow recovery  Bleomycin stopped for following 2 cycles of chemo. AVD completed 5/5/20.  PET CT 7/9/20 showed interval resolution activity related to marrow hyperstimulation. No evidence of disease recurrence.  Port has been removed  Imaging at 6, 12 and 24 months with CT N/C/A/P w/ IV contrast  6 month scans on 1/14/21 and 12 month scans on 7/26/21 without any evidence of disease, 24 month scans on 7/21/22 without evidence of disease  H&P every 3 months for 2 years, then every 6 months for 3rd year and then annually  Previously discussed long term toxicity with Bleomycin (lung disease), long term toxicity with adriamycin (heart disease) and general long term possible complications including MDS and AML.   Colonoscopy scheduled end of September 2023.       Plan   Labs reviewed transaminitis resolved  Continue f/u with GI as recommended. Colonoscopy scheduled in a few weeks.   Hypokalemia managed by PCP w/ supplementation. Will send copy of today's labs. KCL 20 meq BID sent to pharmacy  RTC 6 months cbc cmp  LDH                                       This service was not originating from a related E/M service provided within the previous 7 days nor will  to an E/M service or procedure within the next 24 hours or my soonest available appointment.  Prevailing standard of care was able to be met in this audio-only visit.

## 2023-10-12 ENCOUNTER — TELEPHONE (OUTPATIENT)
Dept: HEMATOLOGY/ONCOLOGY | Facility: CLINIC | Age: 48
End: 2023-10-12
Payer: MEDICARE

## 2024-03-11 ENCOUNTER — OFFICE VISIT (OUTPATIENT)
Dept: HEMATOLOGY/ONCOLOGY | Facility: CLINIC | Age: 49
End: 2024-03-11
Payer: MEDICARE

## 2024-03-11 VITALS
TEMPERATURE: 98 F | HEART RATE: 79 BPM | DIASTOLIC BLOOD PRESSURE: 74 MMHG | RESPIRATION RATE: 20 BRPM | BODY MASS INDEX: 27.76 KG/M2 | HEIGHT: 68 IN | OXYGEN SATURATION: 98 % | SYSTOLIC BLOOD PRESSURE: 115 MMHG | WEIGHT: 183.19 LBS

## 2024-03-11 DIAGNOSIS — C81.18 HODGKIN'S DISEASE, NODULAR SCLEROSIS, OF LYMPH NODES OF MULTIPLE SITES: Primary | ICD-10-CM

## 2024-03-11 PROCEDURE — 99215 OFFICE O/P EST HI 40 MIN: CPT | Mod: ,,,

## 2024-03-11 NOTE — PROGRESS NOTES
Subjective:       Patient ID: Med Larsen Jr. is a 49 y.o. male.    Chief Complaint: Results      History of Present Illness  Chief complaint: Hodgkin Lymphoma, at least stage III    Hem Hx:  12/3/19-5/5/20: ABVD x 2, then AVD    HPI: 48 y/o M w/ PMHx of HTN presented with left neck lump. He went to his PCP who referred him to surgery. He had excisional left neck LN biopsy that revealed classical nodular sclerosis Hodgkin lymphoma. Presents to [facility] for follow up.    PET CT 11/2019 consistent with at least stage III disease. Bone marrow biopsy negative 11/2019.    He started ABVD on 12/3/19, completed AVD 5/5/20    Interim PET CT after C3D1 shows complete resolution of adenopathy    Post treatment PET CT showed interval resolution activity related to marrow hyperstimulation and no evidence of disease recurrence.    Today 03/11/24, patient denies any acute concerns today.  He denies any fevers, chills, drenching night sweats, decreased appetite, weight loss. Labs reviewed and noted chronic hypokalemia being managed by his PCP.  He did not follow-up with screening colonoscopy last September 2023.       PMHx: HTN, Hodgkin lymphoma, DM  PSHx: back surgery x2  Social Hx: current every day smoker 1 ppd, no tobacco, no ETOH  Meds: reviewed  Family Hx: mother: kidney cancer  Allergies: NKDA    Labs:  03/11/24: K+ 2.5, cr 1.14, ca 10.0, alb 3.9, AST 38, ALT 37, , wbc 9.74, hgb 15.6, plt 197, ANC 5.40  09/06/23: cr 1.26, alb 3.7, ca 9.9, K+ 2.6, AST 30, ALT 40, wbc 9.94, hgb 13.4, plt 208  07/26/23: cr 0.99, alb 3.7, ca 9.5, alkphos 93, , AST 92, , wbc 8.76, hgb 12.9, plt 193, ANC 5.09  01/25/2023:  Creatinine 1.51, albumin 3.6, calcium 9.6, K+ 2.2, alkaline phosphatase 96, total bili 0.9, AST 43, ALT 57, , WBC count 8.9, hemoglobin 14.2, platelet count 193, ANC 5.36, sedimentation rate 10.  7/18/22 K 3.7, Cr 0.85, Alb 4.2, TP 7.5, Ca 10.5, , WBC 9.13, Hgb 16.3, , ANC 5.59, ALC  2.58, ESR 1  22 K 3 Glu 267 Cr 1.08 Alb 3.6 TP 6.9 Ca 10.1  WBC 9.48 Hg 15  ANC 5.75 ESR 4  22 Cr 1.06, Alb 3.7, WBC 7.39, Hgb 15.8, , ANC 4.12, ESR 2  10/24/21 Cr 1.19 Alb 3.9 TP 7.5  WBC 7.6 Hg 15.5  ESR 5  21 Cr 1.06, Alb 3.9, TP 7.2, , WBC 8.06, Hgb 14.8, , ANC 3.29, ESR 0  21 Cr 1.08 Alb 3.9 TP 7.4 [id number] WBC 7.57 Hg 15.6  ANC 3.86 ESR 5  1/15/21 Glu 201, K 4.2, Cr 0.99, Alb 4.1, TP 7.7, LDh 170, WBC 9.67, Hgb 16.6, , ANC 5.34, ESR 0  10/21/20 Glu 148 K 3.8 Cr 0.99 Alb 3.7 TP 7.4 AST 23 ALT 16  ESR 6 WBC 8.15 Hg 15.6  ANC 4.43  7/10/20 K 3.13, , hgb 13.3, , ANC 4.69  20 WBC 16.15 Hg 11.  ANC 12.09  20 K 2.95 Cr 0.96  AST 17 ALT 17 WBC 17.77 Hg 12.9  ANC 13.58  3/9/20 Cr 0.87, Glu 114, K 2.87, AlkPhos 127, , WBC 25.35, Hgb 13.6, ANC 18.43,   2/10/20 Cr 1.15 Glu 169 AlkPhos 131  WBC 21.67 Hg 13.8  ANC 16.41  20 Cr 1.2 AlkPhos 174 AST 28 ALT 36  WBC 24.14 Hg 13.1  ANC 17.39  19 Hgb 11.9, , ANC 8.93, WBC 15.50, ALT 55, AST 44, Alkphos 155  19 HIV neg Alb 2.8 TP 9.1 AlkPhos 195 Hep B S ag neg Hep C Ab neg Hep B C ab neg Hep B S ab neg  b HCG <1.2 WBC 8.51 Hg 12.3  ESR 95 AFP 2    Imagin2023 CT abd/pelvis w/ contrast: No evidence of any adenopathy. Few small subcentimeter low-density areas within the liver difficult to characterize due to their small size, most likely represent small hepatic cyst.  2023 Abdominal US: Nonspecific gallbladder distention with no associated findings. No appreciable gallstone. Small hepatic cysts and renal cysts and probable small obstructing intrarenal calculi. No other significant findings within the abdomen. The pancreas is obscured.   22 CT soft tissue neck- no evidence of lymphadenopathy in the neck.   22 CT C/A/P: no evidence of  lymphoproliferative disease in the thorax, abdomen or pelvis. Small nonobstructive left renal stones. Advanced L5-S1 disc disease.     7/26/21 CT N/C/A/P: overall stable appearance to the neck soft tissues, chest, abdomen and pelvis. No evidence of recurrent or progressive disease. Possible wall thickening the proximal transverse colon, correlation with endoscopy if not recently performed. 2 stable incidental pulmonary nodules. No further follow up required according to fleischner criteria.    1/14/21 CT N/C/A/P w/ and w/o contrast: no evidence of lymphoproliferative disease in the neck, thorax, abdomen or pelvis.  7/9/20 PET CT: interval resolution activity related to marrow hyperstimulation. No evidence of disease recurrence.  2/5/20 PET CT: hypermetabolism in marrow likely due to stimulation. Prior hypermetabolic adenopathy has completely resolved  11/18/19 MUGA: LVEF 57%  11/7/19 US scrotum/testicles: no testicular mass. Cysts in b/l epididymal heads  11/7/19 PFTs: FEV1 84%  11/4/19 PET CT: SUV in right axilla 14.7, 3.4x2.1cm, SUV in left axilla 15.2, 4x.18cm, right hilum SUV 8.7 2.9x1.9cm, left obturator SUV 13 1.8cm. SUV in testicles 4.5    Path:  11/6/19 bone marrow biopsy: no evidence of Hodgkin Lymphoma  10/15/19 left neck excisional lymph node biopsy: classic Hodgkin Lymphoma, nodular sclerosis subtype. Fragmented LN with thickened capsule and nodules surrounded by fibrous bands. Nodules contain Tim Tasia cells and lacunar cells. Tim Tasia cells are positive for CD15 and CD30, weak PAX5. No cells positive for ALK1 and pancytokeratin.         Review of Systems  CONSTITUTIONAL: no fevers, no chills, no night sweats, no weight loss, no fatigue, no weakness  HEMATOLOGIC: no abnormal bleeding, no abnormal bruising  ONCOLOGIC: No masses or lumps  HEENT: no vision loss, no tinnitus or hearing loss, no nose bleeding, no dysphagia, no odynophagia  CVS: no chest pain, no palpitations, no dyspnea on  exertion  RESP: no shortness of breath, no hemoptysis, no cough  GI: no nausea, no vomiting, no diarrhea, no constipation, no melena, no hematochezia, no hematemesis, no abdominal pain, no increase in abdominal girth  : no dysuria, no hematuria, no hesitancy, no scrotal swelling, no discharge  INTEGUMENT: no rashes, no abnormal bruising, no nail pitting, no hyperpigmentation  NEURO: no falls, no memory loss, no paresthesias or dysesthesias, no urofecal incontinence or retention, no loss of strength on any extremity  MSK: no back pain, no new joint pain, no joint swelling  PSYCH: no suicidal or homicidal ideation, no depression, no insomnia, no anhedonia  ENDOCRINE: no heat or cold intolerance, no polyuria, no polydipsia         Objective:      Physical Exam  Vitals:    03/11/24 1329   BP: 115/74   Pulse: 79   Resp: 20   Temp: 98.1 °F (36.7 °C)          ECOG PS 0  GA: AAOx3, NAD  HEENT: NCAT, PERRLA, EOMI, fair dentition, no oral ulcers  LYMPH: no cervical, axillary, supraclavicular or inguinal nodes appreciated  CVS: s1s2 RRR, no M/R/G  RESP: CTA b/l, no crackles, mild exp wheez, no rhonchi  ABD: soft, NT, ND, BS+, no hepatosplenomegaly  : no palpable lesions on either testicle  EXT: no deformities, no pedal edema  SKIN: no bruises or purpura, warm and dry, large lipoma right upper back  NEURO: normal mentation, strength 5/5 on all 4 extremities, no sensory deficits     Assessment:       Problem List Items Addressed This Visit    None            Plan:       1. Hodgkin's disease, nodular sclerosis of lymph nodes of multiple sites C81.18   At least stage III disease, PET CT with findings suggestive of stage IV disease if testicular involvement but US negative, bone marrow biopsy negative, LVEF at 57%, PFTs WNL  Plan: ABVD x2, restaging PET CT, if DS 1-3 then AVD x4, if DS 4 then additional 2 cycles of ABVD, followed by PET CT, if DS 5 then escalated BEACOPP/referral to MD Oakes  Interim PET CT after C3D1 with  complete resolution of adenopathy DS 1. Hypermetabolism in marrow was likely due to neulasta and marrow recovery  Bleomycin stopped for following 2 cycles of chemo. AVD completed 5/5/20.  PET CT 7/9/20 showed interval resolution activity related to marrow hyperstimulation. No evidence of disease recurrence.  Port has been removed  Imaging at 6, 12 and 24 months with CT N/C/A/P w/ IV contrast  6 month scans on 1/14/21 and 12 month scans on 7/26/21 without any evidence of disease, 24 month scans on 7/21/22 without evidence of disease  H&P every 3 months for 2 years, then every 6 months for 3rd year and then annually  Previously discussed long term toxicity with Bleomycin (lung disease), long term toxicity with adriamycin (heart disease) and general long term possible complications including MDS and AML.   Colonoscopy scheduled end of September 2023.       Plan   Labs reviewed, noted hypokalemia   Reinforced importance of rescheduling screening colonoscopy.  Hypokalemia managed by PCP w/ supplementation. Reinforced compliance with KCL 20 meq BID. Labs faxed to PCP   RTC 6 months cbc cmp LDH  Will plan to repeat CT CAP and soft tissue neck w/ contrast on RTC

## 2024-10-07 DIAGNOSIS — E87.6 HYPOKALEMIA: Primary | ICD-10-CM

## 2024-10-07 RX ORDER — POTASSIUM CHLORIDE 20 MEQ/1
40 TABLET, EXTENDED RELEASE ORAL 2 TIMES DAILY
Qty: 8 TABLET | Refills: 0 | Status: SHIPPED | OUTPATIENT
Start: 2024-10-07 | End: 2024-10-09

## 2024-10-29 ENCOUNTER — OFFICE VISIT (OUTPATIENT)
Dept: HEMATOLOGY/ONCOLOGY | Facility: CLINIC | Age: 49
End: 2024-10-29
Payer: MEDICARE

## 2024-10-29 VITALS
WEIGHT: 174 LBS | RESPIRATION RATE: 16 BRPM | HEIGHT: 68 IN | DIASTOLIC BLOOD PRESSURE: 83 MMHG | OXYGEN SATURATION: 100 % | BODY MASS INDEX: 26.37 KG/M2 | HEART RATE: 68 BPM | SYSTOLIC BLOOD PRESSURE: 130 MMHG | TEMPERATURE: 99 F

## 2024-10-29 DIAGNOSIS — C81.18 HODGKIN'S DISEASE, NODULAR SCLEROSIS, OF LYMPH NODES OF MULTIPLE SITES: Primary | ICD-10-CM

## 2024-10-29 PROCEDURE — 99214 OFFICE O/P EST MOD 30 MIN: CPT | Mod: ,,, | Performed by: INTERNAL MEDICINE

## 2024-12-05 ENCOUNTER — HOSPITAL ENCOUNTER (EMERGENCY)
Facility: HOSPITAL | Age: 49
Discharge: HOME | End: 2024-12-05
Attending: FAMILY MEDICINE
Payer: MEDICARE

## 2024-12-05 VITALS
BODY MASS INDEX: 26.61 KG/M2 | TEMPERATURE: 99 F | SYSTOLIC BLOOD PRESSURE: 117 MMHG | RESPIRATION RATE: 18 BRPM | DIASTOLIC BLOOD PRESSURE: 85 MMHG | OXYGEN SATURATION: 98 % | HEART RATE: 102 BPM | WEIGHT: 175 LBS

## 2024-12-05 DIAGNOSIS — M51.9 LUMBAR DISC DISEASE: ICD-10-CM

## 2024-12-05 DIAGNOSIS — S39.012A STRAIN OF LUMBAR REGION, INITIAL ENCOUNTER: Primary | ICD-10-CM

## 2024-12-05 PROCEDURE — 63600175 PHARM REV CODE 636 W HCPCS: Performed by: FAMILY MEDICINE

## 2024-12-05 PROCEDURE — 96372 THER/PROPH/DIAG INJ SC/IM: CPT | Performed by: FAMILY MEDICINE

## 2024-12-05 PROCEDURE — 99284 EMERGENCY DEPT VISIT MOD MDM: CPT | Mod: 25

## 2024-12-05 PROCEDURE — 25000003 PHARM REV CODE 250: Performed by: FAMILY MEDICINE

## 2024-12-05 RX ORDER — KETOROLAC TROMETHAMINE 10 MG/1
10 TABLET, FILM COATED ORAL EVERY 6 HOURS
Qty: 20 TABLET | Refills: 0 | Status: SHIPPED | OUTPATIENT
Start: 2024-12-05 | End: 2024-12-10

## 2024-12-05 RX ORDER — KETOROLAC TROMETHAMINE 30 MG/ML
30 INJECTION, SOLUTION INTRAMUSCULAR; INTRAVENOUS
Status: COMPLETED | OUTPATIENT
Start: 2024-12-05 | End: 2024-12-05

## 2024-12-05 RX ORDER — CYCLOBENZAPRINE HCL 10 MG
10 TABLET ORAL 3 TIMES DAILY PRN
Qty: 15 TABLET | Refills: 0 | Status: SHIPPED | OUTPATIENT
Start: 2024-12-05 | End: 2024-12-10

## 2024-12-05 RX ORDER — CYCLOBENZAPRINE HCL 10 MG
10 TABLET ORAL
Status: COMPLETED | OUTPATIENT
Start: 2024-12-05 | End: 2024-12-05

## 2024-12-05 RX ORDER — DEXAMETHASONE SODIUM PHOSPHATE 4 MG/ML
8 INJECTION, SOLUTION INTRA-ARTICULAR; INTRALESIONAL; INTRAMUSCULAR; INTRAVENOUS; SOFT TISSUE
Status: COMPLETED | OUTPATIENT
Start: 2024-12-05 | End: 2024-12-05

## 2024-12-05 RX ORDER — HYDROCODONE BITARTRATE AND ACETAMINOPHEN 10; 325 MG/1; MG/1
1 TABLET ORAL
Status: COMPLETED | OUTPATIENT
Start: 2024-12-05 | End: 2024-12-05

## 2024-12-05 RX ADMIN — KETOROLAC TROMETHAMINE 30 MG: 30 INJECTION, SOLUTION INTRAMUSCULAR; INTRAVENOUS at 04:12

## 2024-12-05 RX ADMIN — HYDROCODONE BITARTRATE AND ACETAMINOPHEN 1 TABLET: 10; 325 TABLET ORAL at 04:12

## 2024-12-05 RX ADMIN — DEXAMETHASONE SODIUM PHOSPHATE 8 MG: 4 INJECTION, SOLUTION INTRA-ARTICULAR; INTRALESIONAL; INTRAMUSCULAR; INTRAVENOUS; SOFT TISSUE at 04:12

## 2024-12-05 RX ADMIN — CYCLOBENZAPRINE 10 MG: 10 TABLET, FILM COATED ORAL at 04:12

## 2024-12-05 NOTE — ED PROVIDER NOTES
Encounter Date: 12/5/2024       History     Chief Complaint   Patient presents with    Back Pain     Pt stated that he has been experiencing worsened chronic back pain radiating down legs. Denied recent injury / fall.      49-year-old male presents the ED reports of worsening chronic back pain.  Patient reports having lumbar fusion 15 years ago by physician at [facility].  Patient reports he is on chronic muscle relaxers which are not improving his pain.  Patient denies any falls or trauma.  Denies any difficulty urinating or defecating.  He is not follow-up with his PCP for chronic pain        Review of patient's allergies indicates:  No Known Allergies  Past Medical History:   Diagnosis Date    Cancer     Hyperlipidemia     Hypertension      Past Surgical History:   Procedure Laterality Date    SPINE SURGERY       Family History   Problem Relation Name Age of Onset    Diabetes Mother Marilee Larsen     Heart disease Mother Marilee Larsen     Hypertension Mother Marilee Larsen     Asthma Father Kimani Larsen     Heart disease Father Kimani Larsen     Hypertension Father Kimani Larsen     Kidney disease Father Kimani Larsen     Stroke Father Kimani Larsen      Social History     Tobacco Use    Smoking status: Every Day     Current packs/day: 1.50     Average packs/day: 1.5 packs/day for 15.0 years (22.5 ttl pk-yrs)     Types: Cigarettes     Passive exposure: Current    Smokeless tobacco: Never   Substance Use Topics    Alcohol use: Not Currently    Drug use: Never     Review of Systems   Musculoskeletal:  Positive for back pain.   All other systems reviewed and are negative.      Physical Exam     Initial Vitals [12/05/24 1536]   BP Pulse Resp Temp SpO2   117/85 102 18 98.5 °F (36.9 °C) 98 %      MAP       --         Physical Exam    Nursing note and vitals reviewed.  Constitutional: Vital signs are normal. He appears well-developed and well-nourished. He is not diaphoretic.  Non-toxic appearance. He does not have  a sickly appearance.   HENT:   Head: Normocephalic and atraumatic.   Right Ear: Hearing, tympanic membrane, external ear and ear canal normal.   Left Ear: Tympanic membrane, external ear and ear canal normal. Mouth/Throat: Uvula is midline, oropharynx is clear and moist and mucous membranes are normal.   Eyes: Conjunctivae, EOM and lids are normal. Pupils are equal, round, and reactive to light. Lids are everted and swept, no foreign bodies found.   Neck: Trachea normal and phonation normal. Neck supple.   Normal range of motion.   Full passive range of motion without pain.     Cardiovascular:  Normal rate, regular rhythm, normal heart sounds, intact distal pulses and normal pulses.           Pulmonary/Chest: Breath sounds normal. No respiratory distress. He has no wheezes. He has no rhonchi. He has no rales. He exhibits no tenderness.   Abdominal: Abdomen is soft. Bowel sounds are normal. There is no abdominal tenderness.   Musculoskeletal:      Cervical back: Normal, full passive range of motion without pain, normal range of motion and neck supple.      Thoracic back: Normal.      Lumbar back: Normal. No swelling, edema, deformity, signs of trauma, lacerations, spasms, tenderness or bony tenderness. Normal range of motion. Negative right straight leg raise test and negative left straight leg raise test. No scoliosis.        Back:       Comments: Noted lumbar scar.  No noted erythema full range of motion     Neurological: He is alert and oriented to person, place, and time. He has normal strength. He displays no atrophy and no tremor. No cranial nerve deficit or sensory deficit. He exhibits normal muscle tone. Coordination and gait normal.   Patient has no Saddle paresthesia, rectal tone normal   Skin: Skin is intact.   Psychiatric: He has a normal mood and affect. His speech is normal and behavior is normal.         ED Course   Procedures  Labs Reviewed - No data to display       Imaging Results    None           Medications   ketorolac injection 30 mg (has no administration in time range)   cyclobenzaprine tablet 10 mg (has no administration in time range)   dexAMETHasone injection 8 mg (has no administration in time range)   HYDROcodone-acetaminophen  mg per tablet 1 tablet (has no administration in time range)     Medical Decision Making                        Medical Decision Making:   Differential Diagnosis:   Lumbar disc disease, strain of lumbar spine, myalgia  ED Management:  Discussed with patient no indication for emergent imaging at this time.  Discussed with patient need to call PCP for further diagnostic studies such as MRI and physical therapy.  Discussed with patient risks and benefits of medication including side effects.  Patient reports he understands plan of care is ready for discharge home             Clinical Impression:  Final diagnoses:  [S39.012A] Strain of lumbar region, initial encounter (Primary)  [M51.9] Lumbar disc disease          ED Disposition Condition    Discharge Stable          ED Prescriptions       Medication Sig Dispense Start Date End Date Auth. Provider    ketorolac (TORADOL) 10 mg tablet Take 1 tablet (10 mg total) by mouth every 6 (six) hours. for 5 days 20 tablet 12/5/2024 12/10/2024 Celio Bingham Jr., MD    cyclobenzaprine (FLEXERIL) 10 MG tablet Take 1 tablet (10 mg total) by mouth 3 (three) times daily as needed for Muscle spasms. 15 tablet 12/5/2024 12/10/2024 Celio Bingham Jr., MD          Follow-up Information       Follow up With Specialties Details Why Contact Info    Hermelindo Dowd III, MD Cardiovascular Disease In 2 days As needed, If symptoms worsen [address] [facility] [address]  [phone]               Celio Bingham Jr., MD  12/05/24 0559

## 2025-07-15 DIAGNOSIS — C81.18 HODGKIN'S DISEASE, NODULAR SCLEROSIS, OF LYMPH NODES OF MULTIPLE SITES: Primary | ICD-10-CM

## 2025-08-07 ENCOUNTER — OFFICE VISIT (OUTPATIENT)
Dept: HEMATOLOGY/ONCOLOGY | Facility: CLINIC | Age: 50
End: 2025-08-07
Payer: MEDICARE

## 2025-08-07 VITALS
OXYGEN SATURATION: 98 % | SYSTOLIC BLOOD PRESSURE: 108 MMHG | WEIGHT: 173 LBS | TEMPERATURE: 99 F | HEART RATE: 84 BPM | HEIGHT: 68 IN | RESPIRATION RATE: 14 BRPM | DIASTOLIC BLOOD PRESSURE: 73 MMHG | BODY MASS INDEX: 26.22 KG/M2

## 2025-08-07 DIAGNOSIS — C81.18 HODGKIN'S DISEASE, NODULAR SCLEROSIS, OF LYMPH NODES OF MULTIPLE SITES: Primary | ICD-10-CM

## 2025-08-07 DIAGNOSIS — R91.8 LUNG NODULES: ICD-10-CM

## 2025-08-07 DIAGNOSIS — R74.01 TRANSAMINITIS: ICD-10-CM

## 2025-08-07 PROCEDURE — 99214 OFFICE O/P EST MOD 30 MIN: CPT | Mod: ,,, | Performed by: STUDENT IN AN ORGANIZED HEALTH CARE EDUCATION/TRAINING PROGRAM

## 2025-08-07 NOTE — PROGRESS NOTES
Chief complaint:  Chief Complaint   Patient presents with    Follow-up         Diagnosis   Hodgkin Lymphoma, at least stage III    Treatment history    12/3/19-5/5/20: ABVD x 2, then AVD    HPI: 49 y/o M w/ PMHx of HTN presented with left neck lump. He went to his PCP who referred him to surgery. He had excisional left neck LN biopsy that revealed classical nodular sclerosis Hodgkin lymphoma. Presents to [facility] for follow up.    PET CT 11/2019 consistent with at least stage III disease. Bone marrow biopsy negative 11/2019.    He started ABVD on 12/3/19, completed AVD 5/5/20    Interim PET CT after C3D1 shows complete resolution of adenopathy    Post treatment PET CT showed interval resolution activity related to marrow hyperstimulation and no evidence of disease recurrence.      Interval history     Today, 08/07/2025,       Labs:  08/06/2025 creatinine 1.19, calcium 10.9, AST 92, ALT 69, WBC count 10.15, hemoglobin 16.1, platelet count 224, ,  10/224: K 2.8 Cr 1.16    03/11/24: K+ 2.5, cr 1.14, ca 10.0, alb 3.9, AST 38, ALT 37, , wbc 9.74, hgb 15.6, plt 197, ANC 5.40  09/06/23: cr 1.26, alb 3.7, ca 9.9, K+ 2.6, AST 30, ALT 40, wbc 9.94, hgb 13.4, plt 208  07/26/23: cr 0.99, alb 3.7, ca 9.5, alkphos 93, , AST 92, , wbc 8.76, hgb 12.9, plt 193, ANC 5.09  01/25/2023:  Creatinine 1.51, albumin 3.6, calcium 9.6, K+ 2.2, alkaline phosphatase 96, total bili 0.9, AST 43, ALT 57, , WBC count 8.9, hemoglobin 14.2, platelet count 193, ANC 5.36, sedimentation rate 10.  7/18/22 K 3.7, Cr 0.85, Alb 4.2, TP 7.5, Ca 10.5, , WBC 9.13, Hgb 16.3, , ANC 5.59, ALC 2.58, ESR 1  4/24/22 K 3 Glu 267 Cr 1.08 Alb 3.6 TP 6.9 Ca 10.1  WBC 9.48 Hg 15  ANC 5.75 ESR 4  1/17/22 Cr 1.06, Alb 3.7, WBC 7.39, Hgb 15.8, , ANC 4.12, ESR 2  10/24/21 Cr 1.19 Alb 3.9 TP 7.5  WBC 7.6 Hg 15.5  ESR 5  7/23/21 Cr 1.06, Alb 3.9, TP 7.2, , WBC 8.06, Hgb 14.8, ,  ANC 3.29, ESR 0  21 Cr 1.08 Alb 3.9 TP 7.4 OCG243 WBC 7.57 Hg 15.6  ANC 3.86 ESR 5  1/15/21 Glu 201, K 4.2, Cr 0.99, Alb 4.1, TP 7.7, LDh 170, WBC 9.67, Hgb 16.6, , ANC 5.34, ESR 0  10/21/20 Glu 148 K 3.8 Cr 0.99 Alb 3.7 TP 7.4 AST 23 ALT 16  ESR 6 WBC 8.15 Hg 15.6  ANC 4.43  7/10/20 K 3.13, , hgb 13.3, , ANC 4.69  20 WBC 16.15 Hg 11.  ANC 12.09  20 K 2.95 Cr 0.96  AST 17 ALT 17 WBC 17.77 Hg 12.9  ANC 13.58  3/9/20 Cr 0.87, Glu 114, K 2.87, AlkPhos 127, , WBC 25.35, Hgb 13.6, ANC 18.43,   2/10/20 Cr 1.15 Glu 169 AlkPhos 131  WBC 21.67 Hg 13.8  ANC 16.41  20 Cr 1.2 AlkPhos 174 AST 28 ALT 36  WBC 24.14 Hg 13.1  ANC 17.39  19 Hgb 11.9, , ANC 8.93, WBC 15.50, ALT 55, AST 44, Alkphos 155  19 HIV neg Alb 2.8 TP 9.1 AlkPhos 195 Hep B S ag neg Hep C Ab neg Hep B C ab neg Hep B S ab neg  b HCG <1.2 WBC 8.51 Hg 12.3  ESR 95 AFP 2    Imagin2025 CT chest with contrast:  Similar pulmonary nodules, similar hepatic cysts changes.  No significant interval change from prior comparison examination    2023 CT abd/pelvis w/ contrast: No evidence of any adenopathy. Few small subcentimeter low-density areas within the liver difficult to characterize due to their small size, most likely represent small hepatic cyst.  2023 Abdominal US: Nonspecific gallbladder distention with no associated findings. No appreciable gallstone. Small hepatic cysts and renal cysts and probable small obstructing intrarenal calculi. No other significant findings within the abdomen. The pancreas is obscured.   22 CT soft tissue neck- no evidence of lymphadenopathy in the neck.   22 CT C/A/P: no evidence of lymphoproliferative disease in the thorax, abdomen or pelvis. Small nonobstructive left renal stones. Advanced L5-S1 disc disease.     21 CT N/C/A/P: overall stable  appearance to the neck soft tissues, chest, abdomen and pelvis. No evidence of recurrent or progressive disease. Possible wall thickening the proximal transverse colon, correlation with endoscopy if not recently performed. 2 stable incidental pulmonary nodules. No further follow up required according to fleischner criteria.    1/14/21 CT N/C/A/P w/ and w/o contrast: no evidence of lymphoproliferative disease in the neck, thorax, abdomen or pelvis.  7/9/20 PET CT: interval resolution activity related to marrow hyperstimulation. No evidence of disease recurrence.  2/5/20 PET CT: hypermetabolism in marrow likely due to stimulation. Prior hypermetabolic adenopathy has completely resolved  11/18/19 MUGA: LVEF 57%  11/7/19 US scrotum/testicles: no testicular mass. Cysts in b/l epididymal heads  11/7/19 PFTs: FEV1 84%  11/4/19 PET CT: SUV in right axilla 14.7, 3.4x2.1cm, SUV in left axilla 15.2, 4x.18cm, right hilum SUV 8.7 2.9x1.9cm, left obturator SUV 13 1.8cm. SUV in testicles 4.5    Path:  11/6/19 bone marrow biopsy: no evidence of Hodgkin Lymphoma  10/15/19 left neck excisional lymph node biopsy: classic Hodgkin Lymphoma, nodular sclerosis subtype. Fragmented LN with thickened capsule and nodules surrounded by fibrous bands. Nodules contain Tim Tasia cells and lacunar cells. Tim Tasia cells are positive for CD15 and CD30, weak PAX5. No cells positive for ALK1 and pancytokeratin.       Past Medical History:   Diagnosis Date    Cancer     Hyperlipidemia     Hypertension        Past Surgical History:   Procedure Laterality Date    SPINE SURGERY         Family History   Problem Relation Name Age of Onset    Diabetes Mother Marilee Meaux     Heart disease Mother Marilee Meaux     Hypertension Mother Marilee Meaux     Asthma Father Kimani Meaux     Heart disease Father Kimani Meaux     Hypertension Father Kimani Meaux     Kidney disease Father Kimani Meaux     Stroke Father Kimani Meaux        Social History     Socioeconomic  History    Marital status: Significant Other   Tobacco Use    Smoking status: Every Day     Current packs/day: 1.50     Average packs/day: 1.5 packs/day for 15.0 years (22.5 ttl pk-yrs)     Types: Cigarettes     Passive exposure: Current    Smokeless tobacco: Never   Substance and Sexual Activity    Alcohol use: Not Currently    Drug use: Never    Sexual activity: Yes     Partners: Female     Birth control/protection: None       Current Outpatient Medications   Medication Sig Dispense Refill    allopurinoL (ZYLOPRIM) 300 MG tablet       amLODIPine (NORVASC) 5 MG tablet       aspirin (ECOTRIN) 81 MG EC tablet Take 81 mg by mouth once daily.      colchicine (COLCRYS) 0.6 mg tablet Take by mouth.      cyclobenzaprine (FLEXERIL) 10 MG tablet       evolocumab (REPATHA SYRINGE) 140 mg/mL Syrg Inject 140 mg into the skin every 14 (fourteen) days.      ezetimibe (ZETIA) 10 mg tablet       HYDROcodone-acetaminophen (NORCO)  mg per tablet       ibuprofen (ADVIL,MOTRIN) 800 MG tablet       JARDIANCE 25 mg tablet       linaCLOtide (LINZESS) 290 mcg Cap capsule Take 290 mcg by mouth before breakfast.      losartan (COZAAR) 100 MG tablet       metFORMIN (GLUCOPHAGE-XR) 500 MG ER 24hr tablet       metoprolol succinate (TOPROL-XL) 100 MG 24 hr tablet       nitroGLYCERIN 0.4 MG/DOSE TL SPRY (NITROLINGUAL) 400 mcg/spray spray       omeprazole (PRILOSEC) 40 MG capsule       potassium chloride SA (K-DUR,KLOR-CON) 20 MEQ tablet Take 1 tablet (20 mEq total) by mouth 2 (two) times daily. 60 tablet 0    REPATHA SURECLICK 140 mg/mL PnIj INJECT 140MG UNDER THE SKIN EVERY 2 WEEKS AS DIRECTED      rosuvastatin (CRESTOR) 40 MG Tab       TRULICITY 0.75 mg/0.5 mL pen injector        No current facility-administered medications for this visit.       Review of patient's allergies indicates:  No Known Allergies      Review of systems  CONSTITUTIONAL: no fevers, no chills, no weight loss, no fatigue, no weakness  HEMATOLOGIC: no abnormal  bleeding, no abnormal bruising, no drenching night sweats  ONCOLOGIC: no new masses or lumps  HEENT: no vision loss, no tinnitus or hearing loss, no nose bleeding, no dysphagia, no odynophagia  CVS: no chest pain, no palpitations, no dyspnea on exertion  RESP: no shortness of breath, no hemoptysis, no cough  GI: no nausea, no vomiting, no diarrhea, no constipation, no melena, no hematochezia, no hematemesis, no abdominal pain, no increase in abdominal girth  : no dysuria, no hematuria, no hesitancy, no scrotal swelling, no discharge  INTEGUMENT: no rashes, no abnormal bruising, no nail pitting, no hyperpigmentation  NEURO: no falls, no memory loss, no paresthesias or dysesthesias, no urofecal incontinence or retention, no loss of strength on any extremity  MSK: no back pain, no new joint pain, no joint swelling  PSYCH: no suicidal or homicidal ideation, no depression, no insomnia, no anhedonia  ENDOCRINE: no heat or cold intolerance, no polyuria, no polydipsia      Physical Exam:  Vitals:    08/07/25 1344   BP: 108/73   Pulse: 84   Resp: 14   Temp: 98.6 °F (37 °C)       ECOG PS 0  GA: AAOx3, NAD  HEENT: NCAT, PERRLA, EOMI, good dentition, moist oral mucous membranes  LYMPH: no cervical, axillary or supraclavicular adenopathy  CVS: s1s2 RRR, no M/R/G  RESP: CTA b/l, no crackles, no wheezes or rhonchi  ABD: soft, NT, ND, BS+, no hepatosplenomegaly  EXT: no deformities, no pedal edema  SKIN: no rashes, warm and dry  NEURO: normal mentation, strength 5/5 on all 4 extremities, no sensory deficits         Assessment    # 1. Hodgkin's disease, nodular sclerosis of lymph nodes of multiple sites C81.18   At least stage III disease, PET CT with findings suggestive of stage IV disease if testicular involvement but US negative, bone marrow biopsy negative, LVEF at 57%, PFTs WNL  Plan: ABVD x2, restaging PET CT, if DS 1-3 then AVD x4, if DS 4 then additional 2 cycles of ABVD, followed by PET CT, if DS 5 then escalated  SAVANNAH/referral to MD Oakes  Interim PET CT after C3D1 with complete resolution of adenopathy DS 1. Hypermetabolism in marrow was likely due to neulasta and marrow recovery  Bleomycin stopped for following 2 cycles of chemo. AVD completed 5/5/20.  PET CT 7/9/20 showed interval resolution activity related to marrow hyperstimulation. No evidence of disease recurrence.  Port has been removed  Imaging at 6, 12 and 24 months with CT N/C/A/P w/ IV contrast  6 month scans on 1/14/21 and 12 month scans on 7/26/21 without any evidence of disease, 24 month scans on 7/21/22 without evidence of disease  H&P every 3 months for 2 years, then every 6 months for 3rd year and then annually  Previously discussed long term toxicity with Bleomycin (lung disease), long term toxicity with adriamycin (heart disease) and general long term possible complications including MDS and AML.   Colonoscopy scheduled end of September 2023.   CT chest with contrast in July 2025 with no evidence of disease      # lung nodules   Patient is a current smoker, 1 pack per day   Patient was advised to follow-up with PCP for surveillance of these lung nodules         Plan   Plan to follow-up in 6 months, labs prior  Imaging as clinically indicated for lymphoma  Follow-up with PCP for findings of lung nodules in his current smoker and grade 1 transaminitis, normal bilirubin.  Patient reports taking Tylenol and Norco however is within daily limits for acetaminophen dose.    Portions of the record may have been created with voice recognition software. Occasional wrong-word or sound-a-like substitutions may have occurred due to the inherent limitations of voice recognition software. Read the chart carefully and recognize, using context, where substitutions have occurred.